# Patient Record
Sex: MALE | Race: BLACK OR AFRICAN AMERICAN | NOT HISPANIC OR LATINO | URBAN - METROPOLITAN AREA
[De-identification: names, ages, dates, MRNs, and addresses within clinical notes are randomized per-mention and may not be internally consistent; named-entity substitution may affect disease eponyms.]

---

## 2024-06-21 ENCOUNTER — EMERGENCY (EMERGENCY)
Facility: HOSPITAL | Age: 29
LOS: 0 days | Discharge: HOME | End: 2024-06-21
Attending: EMERGENCY MEDICINE
Payer: SELF-PAY

## 2024-06-21 VITALS
DIASTOLIC BLOOD PRESSURE: 81 MMHG | OXYGEN SATURATION: 99 % | TEMPERATURE: 98 F | HEIGHT: 77 IN | RESPIRATION RATE: 18 BRPM | HEART RATE: 69 BPM | SYSTOLIC BLOOD PRESSURE: 123 MMHG | WEIGHT: 184.97 LBS

## 2024-06-21 VITALS
OXYGEN SATURATION: 98 % | HEART RATE: 62 BPM | RESPIRATION RATE: 18 BRPM | DIASTOLIC BLOOD PRESSURE: 95 MMHG | SYSTOLIC BLOOD PRESSURE: 155 MMHG | TEMPERATURE: 98 F
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
## 2024-06-21 DIAGNOSIS — K08.89 OTHER SPECIFIED DISORDERS OF TEETH AND SUPPORTING STRUCTURES: ICD-10-CM

## 2024-06-21 DIAGNOSIS — K04.7 PERIAPICAL ABSCESS WITHOUT SINUS: ICD-10-CM
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
## 2024-06-21 DIAGNOSIS — S02.5XXA FRACTURE OF TOOTH (TRAUMATIC), INITIAL ENCOUNTER FOR CLOSED FRACTURE: ICD-10-CM

## 2024-06-21 PROCEDURE — 99283 EMERGENCY DEPT VISIT LOW MDM: CPT

## 2024-06-21 RX ORDER — IBUPROFEN 200 MG
1 TABLET ORAL
Qty: 28 | Refills: 0
Start: 2024-06-21 | End: 2024-06-27

## 2024-06-21 RX ORDER — IBUPROFEN 200 MG
600 TABLET ORAL ONCE
Refills: 0 | Status: COMPLETED | OUTPATIENT
Start: 2024-06-21 | End: 2024-06-21

## 2024-06-21 RX ORDER — CHLORHEXIDINE GLUCONATE 213 G/1000ML
15 SOLUTION TOPICAL
Qty: 1 | Refills: 0
Start: 2024-06-21 | End: 2024-06-27

## 2024-06-21 RX ADMIN — Medication 600 MILLIGRAM(S): at 20:25

## 2024-06-21 RX ADMIN — Medication 1 TABLET(S): at 20:26

## 2024-06-21 NOTE — ED PROVIDER NOTE - CLINICAL SUMMARY MEDICAL DECISION MAKING FREE TEXT BOX
Attending note (Sivakumar): 29-year-old male presenting with dental pain and swelling above tooth approximately 5 or 6 with underlying poor dentition and fracture.  Gum swelling consistent with dental abscess fluctuant to touch.  Incision and drainage performed at bedside.  No signs of deep space infection no signs of Juan's angina is otherwise well-appearing.  No gross facial swelling.  Stable for discharge, Attending note (Sivakumar): 29-year-old male presenting with dental pain and swelling above tooth approximately 5 or 6 with underlying poor dentition and fracture.  Gum swelling consistent with dental abscess fluctuant to touch.  Incision and drainage performed at bedside.  No signs of deep space infection no signs of Juan's angina is otherwise well-appearing.  No gross facial swelling.   Stable for discharge,

## 2024-06-21 NOTE — ED ADULT NURSE NOTE - OBJECTIVE STATEMENT
covering for primary RN, 29-year-old male no reported medical comorbidities presenting with right upper gum pain and swelling.  While waiting in waiting room states that it started to drain spontaneously with blood in mouth.  No pus.  No fever.  History of broken tooth in that area has not seen dentist in some time.

## 2024-06-21 NOTE — ED ADULT NURSE NOTE - NSFALLUNIVINTERV_ED_ALL_ED
Bed/Stretcher in lowest position, wheels locked, appropriate side rails in place/Call bell, personal items and telephone in reach/Instruct patient to call for assistance before getting out of bed/chair/stretcher/Non-slip footwear applied when patient is off stretcher/Vendor to call system/Physically safe environment - no spills, clutter or unnecessary equipment/Purposeful proactive rounding/Room/bathroom lighting operational, light cord in reach

## 2024-06-21 NOTE — ED PROVIDER NOTE - OBJECTIVE STATEMENT
Attending note (Sivakumar): 29-year-old male no reported medical comorbidities presenting with right upper gum pain and swelling.  While waiting in waiting room states that it started to drain spontaneously with blood in mouth.  No pus.  No fever.  History of broken tooth in that area has not seen dentist in some time.

## 2024-06-21 NOTE — ED PROVIDER NOTE - PHYSICAL EXAMINATION
On Physical Exam:  General: well appearing, in NAD, speaking clearly in full sentences and without difficulty; cooperative with exam  HEENT: anicteric sclera, airway patent; oropharynx clear; has 0.5cm swelling on gum above broken tooth (poor dentition) ~5-6; no swelling of floor of mouth, posterior pharnx clear  Neck: no neck tenderness or swelling

## 2024-06-21 NOTE — ED ADULT TRIAGE NOTE - CHIEF COMPLAINT QUOTE
pt c/o gum swelling, headache, fatigue, mild fever started x 2 days ago. last took ibuprofen x 3 hrs ago with temporary relief.

## 2024-06-21 NOTE — ED PROVIDER NOTE - NSFOLLOWUPINSTRUCTIONS_ED_ALL_ED_FT
You were evaluated in the Emergency Department for a dental abscess.  You were evaluated and examined by a physician, and you had a drainage performed in the ED.      You will need further evaluation and treatment by a dentist.    There are no signs of emergency conditions requiring admission to the hospital on today's workup.  Based on the evaluation, a presumptive diagnosis was made, however, further evaluation may be required by your primary care physician or a specialist for a more definitive diagnosis.  Therefore, please follow-up as directed or return to the Emergency Department if your symptoms change or worsen.    We recommend that you:  1. See a dentist within the next 1 week.  Bring a copy of your discharge paperwork (including any test results) to your doctor.  2. Take ibuprofen 600mg every 6 hours as needed for pain.   3. Take Augmentin (antibiotic) as prescribed (take with food).      *** Return immediately if you have worsening pain, increased swelling, fever, swelling of the neck, difficulty swallowing or breathing, or any other new/concerning symptoms. ***       [facility]  Dental Clinic  A program of [facility]   [phone] [address], [facility] [place] [address] You were evaluated in the Emergency Department for a dental abscess.  You were evaluated and examined by a physician, and you had a drainage performed in the ED.      You will need further evaluation and treatment by a dentist.    There are no signs of emergency conditions requiring admission to the hospital on today's workup.  Based on the evaluation, a presumptive diagnosis was made, however, further evaluation may be required by your primary care physician or a specialist for a more definitive diagnosis.  Therefore, please follow-up as directed or return to the Emergency Department if your symptoms change or worsen.    We recommend that you:  1. See a dentist within the next 1 week.  Bring a copy of your discharge paperwork (including any test results) to your doctor.  2. Take ibuprofen 600mg every 6 hours as needed for pain.   3. Take Augmentin (antibiotic) as prescribed (take with food).  4. Use Peridex mouth wash as directed.      *** Return immediately if you have worsening pain, increased swelling, fever, swelling of the neck, difficulty swallowing or breathing, or any other new/concerning symptoms. ***       [facility]  Dental Clinic  A program of [facility]   [phone] [address], [facility] [place] [address]